# Patient Record
Sex: MALE | Race: WHITE | NOT HISPANIC OR LATINO | Employment: FULL TIME | URBAN - METROPOLITAN AREA
[De-identification: names, ages, dates, MRNs, and addresses within clinical notes are randomized per-mention and may not be internally consistent; named-entity substitution may affect disease eponyms.]

---

## 2020-10-29 ENCOUNTER — TELEMEDICINE (OUTPATIENT)
Dept: SLEEP MEDICINE | Facility: HOSPITAL | Age: 55
End: 2020-10-29

## 2020-10-29 DIAGNOSIS — M79.662 PAIN IN BOTH LOWER LEGS: ICD-10-CM

## 2020-10-29 DIAGNOSIS — F17.218 CIGARETTE NICOTINE DEPENDENCE WITH OTHER NICOTINE-INDUCED DISORDER: ICD-10-CM

## 2020-10-29 DIAGNOSIS — M79.661 PAIN IN BOTH LOWER LEGS: ICD-10-CM

## 2020-10-29 DIAGNOSIS — K21.9 GASTROESOPHAGEAL REFLUX DISEASE, UNSPECIFIED WHETHER ESOPHAGITIS PRESENT: ICD-10-CM

## 2020-10-29 DIAGNOSIS — R53.83 FATIGUE, UNSPECIFIED TYPE: ICD-10-CM

## 2020-10-29 DIAGNOSIS — G47.33 OSA (OBSTRUCTIVE SLEEP APNEA): Primary | ICD-10-CM

## 2020-10-29 DIAGNOSIS — E66.9 OBESITY (BMI 30-39.9): ICD-10-CM

## 2020-10-29 PROCEDURE — 99204 OFFICE O/P NEW MOD 45 MIN: CPT | Performed by: INTERNAL MEDICINE

## 2020-10-29 RX ORDER — PRAMIPEXOLE DIHYDROCHLORIDE 0.75 MG/1
0.75 TABLET ORAL NIGHTLY
Qty: 30 TABLET | Refills: 2 | Status: SHIPPED | OUTPATIENT
Start: 2020-10-29

## 2020-10-29 NOTE — PROGRESS NOTES
New Sleep Patient Office Visit      Patient Name: Srikanth Harrison    Referring Physician: No ref. provider found    You have chosen to receive care through a telehealth visit.  Do you consent to use a video/audio connection for your medical care today? Yes      Chief Complaint:    Chief Complaint   Patient presents with   • Sleep Apnea       History of Present Illness: Srikanth Harrison is a 55 y.o. male who is here today to establish care with Sleep Medicine.    55-year-old male who carries diagnosis of sleep apnea based on sleep study done about 8 years ago at [facility].  Patient states that he had severe sleep apnea with apnea-hypopnea index over 90.  He was put on CPAP pressure of 12 cmH2O at that time and he has been using it ever since.  His physician retired from the facility so is establishing care here.  States that he used to get his supplies through DME company but he wants to switch them out as well.  He states that machine is working well for him.  He uses it every time he goes to sleep on average about 7 hours per sleep..  He works for [employer] as a  and is working currently 1 AM to 12 PM shift.  He generally goes to bed around 4:30 PM and wake up around midnight to go to work.  He states that he is able to sleep pretty well during those hours.  On the weekends he switches himself to night sleep.  States that he has no trouble flipping back and forth and has good quality sleep.  He does drink quite a bit of caffeine maybe up to a pot a day when he is working and even on the weekends.  He also smokes half to 1 pack a day which he has been doing for 5 years he used to smoke earlier but quit 10 then restarted.  He does have occasional GERD symptoms and occasionally he will wake him up from his sleep as well but that is not common occurrence.  Patient denies any driving problems with driving accidents.  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  His wife mentions that patient has lot of jerking and leg movements during sleep.  He would raise his arms and legs some time and jerks around and happens early in the night.  There is no dream enactment behavior with that.  He was put on pramipexole number of years back and when he takes it his symptoms improved but he does not routinely take it.  Currently he is only taking it when he is sharing that with his wife on the weekends.  It does not affect his sleep.  Patient has not woken himself up going that.  There is no automatic behavior no injury to self.  Does not look like seizure disorder either.  No REM behavior disorder noted.  Patient occasionally feels dryness of the mouth he states that he never uses the humidifier with the machine either.  Denies any leg edema.  Denies any chest pain or shortness of breath.    Further sleep history is as below.    [place] Scale: 5/24    Estimated average amount of sleep per night: 6-8 h  Number of times  he wakes up at night: 0  Difficulty falling back asleep: NA  It usually takes 10 minutes to go to sleep.  He feels sleepy upon waking up: no  Rotating or night shift work: yes    Drowsiness/Sleepiness:  He exhibits the following:  Improved with CPAP    Snoring/Breathing:  He exhibits the following:  None with CPAP    Reflux:  He describes the following:  Occasional    Narcolepsy:  He exhibits the following:  none    RLS/PLMs:  He describes the following:  moves or jerks during sleep    Insomnia:  He describes the following:  None    Parasomnia:  He exhibits the following:  None    Weight:  Weight change in the last year:  Stable    Subjective      Review of Systems:   Review of Systems   Constitutional: Positive for fatigue.   HENT: Negative.    Respiratory: Negative.    Cardiovascular: Negative.    Gastrointestinal: Negative.    Endocrine: Negative.    Musculoskeletal: Negative.    Skin: Negative.    Neurological:  Negative.    Hematological: Negative.    Psychiatric/Behavioral: Negative.    All other systems reviewed and are negative.      Past Medical History:   Past Medical History:   Diagnosis Date   • GEORGINA (obstructive sleep apnea)        Past Surgical History:   Past Surgical History:   Procedure Laterality Date   • ROTATOR CUFF REPAIR  2014., 2018    Bilateral       Family History: History reviewed. No pertinent family history.    Social History:   Social History     Socioeconomic History   • Marital status:      Spouse name: Not on file   • Number of children: Not on file   • Years of education: Not on file   • Highest education level: Not on file   Tobacco Use   • Smoking status: Current Every Day Smoker     Packs/day: 1.00     Years: 5.00     Pack years: 5.00   Substance and Sexual Activity   • Alcohol use: Not Currently   • Drug use: Never   • Sexual activity: Defer       Medications:     Current Outpatient Medications:   •  pramipexole (MIRAPEX) 0.75 MG tablet, Take 1 tablet by mouth Every Night., Disp: 30 tablet, Rfl: 2    Allergies:   No Known Allergies    Objective     Physical Exam:  Vital Signs: There were no vitals filed for this visit.    Physical Exam  Constitutional:       General: He is not in acute distress.     Appearance: Normal appearance. He is obese. He is not diaphoretic.   HENT:      Head: Normocephalic and atraumatic.      Comments: Mallampati 3 airway  Pulmonary:      Effort: Pulmonary effort is normal. No respiratory distress.      Breath sounds: No stridor.   Musculoskeletal:      Right lower leg: No edema.      Left lower leg: No edema.   Neurological:      Mental Status: He is alert and oriented to person, place, and time.   Psychiatric:         Mood and Affect: Mood normal.         Behavior: Behavior normal.         Thought Content: Thought content normal.         Judgment: Judgment normal.         Results Review:     CPAP download report reviewed which patient has provided to us.  I  did advise the patient that his date of birth is wrong on the report.  On review of available data shows patient's average usage for last year is 7 hours and 13 minutes.  No significant leak noted.  Average AHI 1.  More than 4-hour usage is 82% of the times.  More than 6-hour usage is 69.59%.    Assessment / Plan      Assessment:   Problem List Items Addressed This Visit     None      Visit Diagnoses     GEORGINA (obstructive sleep apnea)    -  Primary    Relevant Orders    CPAP Therapy    Fatigue, unspecified type        Relevant Orders    TSH    T4, Free    Pain in both lower legs        Relevant Orders    Ferritin Level    Obesity (BMI 30-39.9)        Cigarette nicotine dependence with other nicotine-induced disorder        Gastroesophageal reflux disease, unspecified whether esophagitis present                Plan:   1.  Patient with severe sleep apnea per report and on CPAP therapy.  Download looks excellent and patient symptoms are better with the use of CPAP.  Subjective and objective data suggest treated sleep apnea on current settings.  Continue same treatment plan at this time.  Refill given for CPAP supplies and sent to [facility] per patient request.    2.  Patient is not sure if he ever had any thyroid function test done.  We will go ahead and get that looked at to rule out any occult hypothyroidism.    3.  Patient has periodic limb movements during sleep.  Does not fit with seizure disorder as it is not stereotypic episodes.  Pramipexole seems to be controlling his symptoms.  He denies any overt side effects from medication.  Denies any disinhibition symptoms.  Denies any addiction problems.  Advised patient to continue as needed.  We did talk about cutting down on caffeine intake, exercise, stretching before going to bed or warm shower to try to help his symptoms outside of medications.  Occasional restless leg symptoms.  Will check iron studies such as ferritin level to make sure that is adequate.    4.   Patient does have significant reflux symptoms.  Again advised to cut down on caffeine intake that should help.  If continues to be problem may need PPI or H2 blockers.  Consult to have diet control.  Increasing activity and weight loss should help that regard as well.    5.  Patient counseled to work towards quitting smoking.  States that he already has patches and he is going to work on that aspect.    At this point I will see him back in 1 years time or sooner as needed.  Patient had no further questions at this time.    Follow Up:   1 year    Discussed plan of care in detail with patient today. Patient verbally understands and agrees.     Naeem Martines MD  Pulmonary Critical Care and Sleep Medicine      Please note that portions of this note may have been completed with a voice recognition program. Efforts were made to edit the dictations, but occasionally words are mistranscribed.

## 2021-05-04 ENCOUNTER — HOSPITAL ENCOUNTER (OUTPATIENT)
Dept: GENERAL RADIOLOGY | Facility: HOSPITAL | Age: 56
Discharge: HOME | End: 2021-05-04
Admitting: INTERNAL MEDICINE

## 2021-05-04 ENCOUNTER — TRANSCRIBE ORDERS (OUTPATIENT)
Dept: ADMINISTRATIVE | Facility: HOSPITAL | Age: 56
End: 2021-05-04

## 2021-05-04 DIAGNOSIS — R07.9 CHEST PAIN, UNSPECIFIED TYPE: Primary | ICD-10-CM

## 2021-05-04 PROCEDURE — 71046 X-RAY EXAM CHEST 2 VIEWS: CPT

## 2021-05-05 ENCOUNTER — TRANSCRIBE ORDERS (OUTPATIENT)
Dept: ADMINISTRATIVE | Facility: HOSPITAL | Age: 56
End: 2021-05-05

## 2021-05-05 DIAGNOSIS — R07.9 CRUSHING CHEST PAIN: Primary | ICD-10-CM

## 2021-06-09 ENCOUNTER — HOSPITAL ENCOUNTER (OUTPATIENT)
Dept: CARDIOLOGY | Facility: HOSPITAL | Age: 56
Discharge: HOME | End: 2021-06-09
Admitting: INTERNAL MEDICINE

## 2021-06-09 DIAGNOSIS — R07.9 CRUSHING CHEST PAIN: ICD-10-CM

## 2021-06-09 LAB
BH CV REST NUCLEAR ISOTOPE DOSE: 9.9 MCI
BH CV STRESS BP STAGE 1: NORMAL
BH CV STRESS BP STAGE 2: NORMAL
BH CV STRESS BP STAGE 3: NORMAL
BH CV STRESS DURATION MIN STAGE 1: 3
BH CV STRESS DURATION MIN STAGE 2: 3
BH CV STRESS DURATION MIN STAGE 3: 3
BH CV STRESS DURATION SEC STAGE 1: 0
BH CV STRESS DURATION SEC STAGE 2: 0
BH CV STRESS DURATION SEC STAGE 3: 30
BH CV STRESS GRADE STAGE 1: 10
BH CV STRESS GRADE STAGE 2: 12
BH CV STRESS GRADE STAGE 3: 14
BH CV STRESS HR STAGE 1: 100
BH CV STRESS HR STAGE 2: 120
BH CV STRESS HR STAGE 3: 131
BH CV STRESS METS STAGE 1: 5
BH CV STRESS METS STAGE 2: 7.5
BH CV STRESS METS STAGE 3: 10.1
BH CV STRESS NUCLEAR ISOTOPE DOSE: 33 MCI
BH CV STRESS PROTOCOL 1: NORMAL
BH CV STRESS RECOVERY BP: NORMAL MMHG
BH CV STRESS RECOVERY HR: 82 BPM
BH CV STRESS SPEED STAGE 1: 1.7
BH CV STRESS SPEED STAGE 2: 2.5
BH CV STRESS SPEED STAGE 3: 3.4
BH CV STRESS STAGE 1: 1
BH CV STRESS STAGE 2: 2
BH CV STRESS STAGE 3: 3
LV EF NUC BP: 70 %
MAXIMAL PREDICTED HEART RATE: 164 BPM
PERCENT MAX PREDICTED HR: 86.59 %
STRESS BASELINE BP: NORMAL MMHG
STRESS BASELINE HR: 63 BPM
STRESS PERCENT HR: 102 %
STRESS POST ESTIMATED WORKLOAD: 10.1 METS
STRESS POST EXERCISE DUR MIN: 9 MIN
STRESS POST EXERCISE DUR SEC: 30 SEC
STRESS POST PEAK BP: NORMAL MMHG
STRESS POST PEAK HR: 142 BPM
STRESS TARGET HR: 139 BPM

## 2021-06-09 PROCEDURE — A9500 TC99M SESTAMIBI: HCPCS | Performed by: INTERNAL MEDICINE

## 2021-06-09 PROCEDURE — 93017 CV STRESS TEST TRACING ONLY: CPT

## 2021-06-09 PROCEDURE — 93018 CV STRESS TEST I&R ONLY: CPT | Performed by: INTERNAL MEDICINE

## 2021-06-09 PROCEDURE — 0 TECHNETIUM SESTAMIBI: Performed by: INTERNAL MEDICINE

## 2021-06-09 PROCEDURE — 78452 HT MUSCLE IMAGE SPECT MULT: CPT

## 2021-06-09 PROCEDURE — 78452 HT MUSCLE IMAGE SPECT MULT: CPT | Performed by: INTERNAL MEDICINE

## 2021-06-09 RX ADMIN — TECHNETIUM TC 99M SESTAMIBI 1 DOSE: 1 INJECTION INTRAVENOUS at 10:00

## 2021-06-09 RX ADMIN — TECHNETIUM TC 99M SESTAMIBI 1 DOSE: 1 INJECTION INTRAVENOUS at 08:20

## 2022-07-21 ENCOUNTER — OFFICE VISIT (OUTPATIENT)
Dept: SLEEP MEDICINE | Facility: HOSPITAL | Age: 57
End: 2022-07-21

## 2022-07-21 VITALS
SYSTOLIC BLOOD PRESSURE: 134 MMHG | DIASTOLIC BLOOD PRESSURE: 78 MMHG | HEART RATE: 65 BPM | BODY MASS INDEX: 34.3 KG/M2 | HEIGHT: 71 IN | OXYGEN SATURATION: 96 % | WEIGHT: 245 LBS

## 2022-07-21 DIAGNOSIS — F17.218 CIGARETTE NICOTINE DEPENDENCE WITH OTHER NICOTINE-INDUCED DISORDER: ICD-10-CM

## 2022-07-21 DIAGNOSIS — G47.33 OSA (OBSTRUCTIVE SLEEP APNEA): Primary | ICD-10-CM

## 2022-07-21 DIAGNOSIS — E66.9 OBESITY (BMI 30-39.9): ICD-10-CM

## 2022-07-21 PROCEDURE — 99214 OFFICE O/P EST MOD 30 MIN: CPT | Performed by: INTERNAL MEDICINE

## 2022-07-21 NOTE — PROGRESS NOTES
Follow Up Office Visit      Patient Name: Srikanth Harrison    Chief Complaint:    Chief Complaint   Patient presents with   • Follow-up       History of Present Illness: Srikanth Harrison is a 56 y.o. male who is here today for follow up of GEORGINA    56-year-old male who carries diagnosis of sleep apnea based on sleep study done about 8 years ago at [facility].  Patient states that he had severe sleep apnea with apnea-hypopnea index over 90.  He was put on CPAP pressure of 12 cmH2O at that time and he has been using it ever since.  His physician retired from the facility so is establishing here. Pt states that machine is working well.  He uses it every time he goes to sleep on average about 8 hours per sleep.  He works [employer] and is working currently 1 AM to 12 PM shift.  Some days he goes to bed right after he gets done with a history of wakes up to have support with his wife and then sleeps again.  Some days he goes to bed around 4:00 and then wake up at 1130 and able to get back to sleep during those days.  He denies any trouble sleeping at night.  Denies any excessive daytime fatigue or tiredness.  His [place] score is 4 out of 24.  He was placed on Requip but states that it was started because of.  Limb movements.  He denies any restless leg symptoms at all.  States that he was getting addicted to it and unable to sleep without taking the medicine so he has been himself off Requip.  Denies any ongoing restless leg symptoms.  States that when he shares bed with his wife she does not complain about his leg movements either.  He is not using continue defecation system.  Occasionally gets redness of the mouth but normally does not an issue.  Advised that if he starts having more dryness issues then he could use the humidification system.  He verbalized understanding.  He has dream station machine and is waiting for replacement on the recall.  He is however compliant with his CPAP device and using it and states that he  "cannot sleep without it.  Denies any driving problems or sleep-related accidents.    Patient continues to smoke.  Off-and-on smoked for about 15 years pack a day.  No planning to quit.  Denies any ongoing respiratory symptoms.      Subjective      Review of Systems:   Review of Systems   Constitutional: Negative.    HENT: Negative.    Respiratory: Negative.    Cardiovascular: Negative.    Gastrointestinal: Negative.    Endocrine: Negative.    Musculoskeletal: Negative.    Skin: Negative.    Neurological: Negative.    Hematological: Negative.    Psychiatric/Behavioral: Negative.    All other systems reviewed and are negative.      The following portions of the patient's history were reviewed and updated as appropriate: allergies, current medications, past family history, past medical history, past social history, past surgical history and problem list.    Objective     Physical Exam:  Vital Signs:   Vitals:    07/21/22 1548   BP: 134/78   Pulse: 65   SpO2: 96%   Weight: 111 kg (245 lb)   Height: 179.1 cm (70.5\")     Body mass index is 34.66 kg/m².    Physical Exam  Vitals and nursing note reviewed.   Constitutional:       General: He is not in acute distress.     Appearance: He is well-developed. He is not diaphoretic.   HENT:      Head: Normocephalic and atraumatic.      Comments: Mallampati 3 airway  Neck:      Thyroid: No thyromegaly.      Trachea: No tracheal deviation.   Cardiovascular:      Rate and Rhythm: Normal rate and regular rhythm.      Heart sounds: Normal heart sounds. No murmur heard.    No friction rub. No gallop.   Pulmonary:      Effort: Pulmonary effort is normal. No respiratory distress.      Breath sounds: Normal breath sounds. No wheezing or rales.   Musculoskeletal:         General: No tenderness.      Right lower leg: No edema.      Left lower leg: No edema.      Comments: Clubbing: none   Neurological:      Mental Status: He is alert and oriented to person, place, and time.      " Coordination: Coordination normal.   Psychiatric:         Behavior: Behavior normal.         Thought Content: Thought content normal.         Judgment: Judgment normal.         Results Review:   I reviewed the patient's new clinical results.    CPAP download reviewed at CPAP 8-18 cm H20 and compliance >4 hours 90%. AHI 3.2. Avg usage 8 h 24 min. No significant leak noted.     Previous chest x-ray reviewed done last year and did not show any acute cardiopulmonary disease process.    Assessment / Plan      Assessment:   Problem List Items Addressed This Visit    None     Visit Diagnoses     GEORGINA (obstructive sleep apnea)    -  Primary    Relevant Orders    PAP Therapy    Obesity (BMI 30-39.9)        Cigarette nicotine dependence with other nicotine-induced disorder              Plan:   1.  Patient with a history of severe obstructive sleep apnea, on CPAP therapy.  Tolerating well.  Compliance is excellent and sleep apnea is treated on current settings, continue same.  Prescription renewed for CPAP supplies.  Advised to use the medication system restart start  getting dryness of the mouth more.  He is agreeable with this plan.  2.  Advised to continue using the device.  He is not using any so clean cleaning system either.  Hopefully he will get replacement from [employer] soon.  3.  Smoking cessation counseled.  Not symptomatic from pulmonary standpoint.  May consider  lung cancer screening CT scan.  He will discuss it further with his primary care provider.  4.  Increasing activity and weight loss counseled and its impact on sleep apnea reviewed.    Will continue to monitor annually or sooner as needed    Follow Up:   Return in about 1 year (around 7/21/2023).    Discussed plan of care in detail with patient today. Patient verbally understands and agrees. I spent 32 minutes on this date of service. This time includes time spent by me in the following activities:preparing for the visit, reviewing tests, obtaining and/or  reviewing a separately obtained history, performing a medically appropriate examination, counseling the patient, ordering CPAP supplies and/or documenting information in the medical record.    Naeem Martines MD  Pulmonary Critical Care and Sleep Medicine

## 2023-07-25 ENCOUNTER — OFFICE VISIT (OUTPATIENT)
Dept: SLEEP MEDICINE | Facility: HOSPITAL | Age: 58
End: 2023-07-25
Payer: COMMERCIAL

## 2023-07-25 VITALS
OXYGEN SATURATION: 93 % | WEIGHT: 267 LBS | HEART RATE: 73 BPM | BODY MASS INDEX: 37.38 KG/M2 | DIASTOLIC BLOOD PRESSURE: 70 MMHG | SYSTOLIC BLOOD PRESSURE: 114 MMHG | HEIGHT: 71 IN

## 2023-07-25 DIAGNOSIS — G47.33 OSA (OBSTRUCTIVE SLEEP APNEA): Primary | ICD-10-CM

## 2024-07-23 ENCOUNTER — OFFICE VISIT (OUTPATIENT)
Dept: SLEEP MEDICINE | Age: 59
End: 2024-07-23
Payer: COMMERCIAL

## 2024-07-23 VITALS
WEIGHT: 277 LBS | TEMPERATURE: 96.9 F | HEIGHT: 71 IN | DIASTOLIC BLOOD PRESSURE: 80 MMHG | HEART RATE: 82 BPM | SYSTOLIC BLOOD PRESSURE: 120 MMHG | OXYGEN SATURATION: 97 % | BODY MASS INDEX: 38.78 KG/M2

## 2024-07-23 DIAGNOSIS — G47.33 OBSTRUCTIVE SLEEP APNEA, ADULT: Primary | ICD-10-CM

## 2024-07-23 DIAGNOSIS — E66.9 OBESITY (BMI 35.0-39.9 WITHOUT COMORBIDITY): ICD-10-CM

## 2024-07-23 PROCEDURE — 99213 OFFICE O/P EST LOW 20 MIN: CPT | Performed by: NURSE PRACTITIONER

## 2025-07-24 NOTE — PROGRESS NOTES
Sleep Clinic Follow Up Note    Chief Complaint  Sleep Apnea and Restless Legs Syndrome    Subjective     History of Present Illness (from previous encounter on 7/23/2024):  Srikanth Harrison is a 58 y.o. male who returns for follow-up and compliance of PAP therapy.  The Pap report has been reviewed.  Overall usage is at 100% with compliance at 97.8%.  The patient averages 8 hours and 15 minutes of therapy.  Sleep apnea is well-controlled with an AHI of 0.8/H. I will refill the patient's supplies, and I have asked them to return for follow-up and compliance in 1 year or sooner should they have further questions or concerns. (End copied text).    Interval History:  Srikanth Harrison is a 59 y.o. male returns for follow up and compliance of PAP therapy. The patient was last seen on 7/23/2024 by me. Overall the patient feels good with regard to therapy. The device does not appear to be working appropriately.  It has not recorded data and has trouble transmitting data.  On average the patient sleeps 7-9 hours per night. The patient wakes 1-2 times per night. He uses his device nightly. He has a DOT license.     The patient reports the following changes to their medical and medication history since they were last seen:  Going to have shoulder surgery    Further details are as follows:      [place] Scale is (out of 24): Total score: 0     Weight:  Current Weight: 285 lb      The patient's relevant past medical, surgical, family, and social history reviewed and updated in Epic as appropriate.    PMH:    Past Medical History:   Diagnosis Date    GEORGINA (obstructive sleep apnea)     RLS (restless legs syndrome)      Past Surgical History:   Procedure Laterality Date    ROTATOR CUFF REPAIR  2014., 2018    Bilateral       No Known Allergies    MEDS:  Prior to Admission medications    Medication Sig Start Date End Date Taking? Authorizing Provider   pramipexole (MIRAPEX) 0.75 MG tablet Take 1 tablet by mouth Every Night. 10/29/20   Bobbi  "Naeem HAWKINS MD         FH:  History reviewed. No pertinent family history.    Objective   Vital Signs:  /86   Pulse 70   Temp 98.5 °F (36.9 °C) (Infrared)   Ht 177.8 cm (70\")   Wt 130 kg (285 lb 12.8 oz)   SpO2 95%   BMI 41.01 kg/m²     Patient's (Body mass index is 41.01 kg/m².) indicates that they are morbidly obese (BMI > 40 or > 35 with obesity - related health condition)         Physical Exam  Vitals reviewed.   Constitutional:       Appearance: Normal appearance.   HENT:      Head: Normocephalic and atraumatic.      Nose: Nose normal.      Mouth/Throat:      Mouth: Mucous membranes are moist.   Cardiovascular:      Rate and Rhythm: Normal rate and regular rhythm.      Heart sounds: No murmur heard.     No friction rub. No gallop.   Pulmonary:      Effort: Pulmonary effort is normal. No respiratory distress.      Breath sounds: Normal breath sounds. No wheezing or rhonchi.   Neurological:      Mental Status: He is alert and oriented to person, place, and time.   Psychiatric:         Behavior: Behavior normal.               PAP Report:  AHI: 1.2/h  Days of Usage: 90/90 (100%)  Number of Days Greater than 4 hours: 100%  Average time (days used): 9 hours 40 minutes  95th Percentile Pressure: 12 cmH2O  95th percentile leaks: 4 L/min  Settings: CPAP-C-Flex-12 cm H2O, C-Flex-2, ramp time 15 minutes, ramp start pressure 4 cm H2O.       Assessment and Plan  Srikanth Harrison is a 59 y.o. male who returns for follow-up and compliance of PAP therapy.  The Pap report has been reviewed.  We were able to obtain a download with quite a bit of difficulty.  Overall usage and compliance are excellent at 100%.  The patient averages 9 hours and 40 minutes of therapy.  Sleep apnea is well-controlled with an AHI of 1.2/H.  At this point I will order a new device.  The patient's current device is not functioning appropriately and is not recording or transmitting data appropriately.    I will refill the patient's supplies, and I " have asked him to return for follow-up in compliance in 31-90 days with new device.    Diagnoses and all orders for this visit:    1. Obstructive sleep apnea, adult (Primary)  -     PAP Therapy    2. Obesity (BMI 35.0-39.9 without comorbidity)         The patient continues to use and benefit from PAP therapy.    1. The patient was counseled regarding multimodal approach with healthy nutrition, healthy sleep, regular physical activity, social activities, counseling, and medications. Encouraged to practice lateral sleep position. Avoid alcohol and sedatives close to bedtime.     2.  We will refill supplies x1 year.          Follow Up  Return for 31 to 90 days after PAP setup.  Patient was given instructions and counseling regarding his condition or for health maintenance advice. Please see specific information pulled into the AVS if appropriate.       LORENZO Chaves, ACNP-BC  Pulmonology, Critical Care, and Sleep Medicine

## 2025-07-28 ENCOUNTER — OFFICE VISIT (OUTPATIENT)
Dept: SLEEP MEDICINE | Age: 60
End: 2025-07-28
Payer: COMMERCIAL

## 2025-07-28 VITALS
HEIGHT: 70 IN | OXYGEN SATURATION: 95 % | TEMPERATURE: 98.5 F | HEART RATE: 70 BPM | SYSTOLIC BLOOD PRESSURE: 130 MMHG | WEIGHT: 285.8 LBS | DIASTOLIC BLOOD PRESSURE: 86 MMHG | BODY MASS INDEX: 40.92 KG/M2

## 2025-07-28 DIAGNOSIS — E66.9 OBESITY (BMI 35.0-39.9 WITHOUT COMORBIDITY): ICD-10-CM

## 2025-07-28 DIAGNOSIS — G47.33 OBSTRUCTIVE SLEEP APNEA, ADULT: Primary | ICD-10-CM

## 2025-07-28 PROCEDURE — 99213 OFFICE O/P EST LOW 20 MIN: CPT | Performed by: NURSE PRACTITIONER
